# Patient Record
Sex: MALE | Race: WHITE
[De-identification: names, ages, dates, MRNs, and addresses within clinical notes are randomized per-mention and may not be internally consistent; named-entity substitution may affect disease eponyms.]

---

## 2019-10-21 ENCOUNTER — HOSPITAL ENCOUNTER (EMERGENCY)
Dept: HOSPITAL 75 - ER FS | Age: 54
Discharge: HOME | End: 2019-10-21
Payer: COMMERCIAL

## 2019-10-21 VITALS — DIASTOLIC BLOOD PRESSURE: 89 MMHG | SYSTOLIC BLOOD PRESSURE: 151 MMHG

## 2019-10-21 VITALS — BODY MASS INDEX: 31.39 KG/M2 | WEIGHT: 224.21 LBS | HEIGHT: 70.87 IN
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
## 2019-10-21 DIAGNOSIS — S50.861A: Primary | ICD-10-CM

## 2019-10-21 DIAGNOSIS — L03.113: ICD-10-CM

## 2019-10-21 PROCEDURE — 99283 EMERGENCY DEPT VISIT LOW MDM: CPT

## 2019-10-21 NOTE — ED INTEGUMENTARY GENERAL
General


Chief Complaint:  Bite-Animal/Human/Insect


Stated Complaint:  RT ARM SPIDER BITE


Source:  patient


Exam Limitations:  no limitations





History of Present Illness


Date Seen by Provider:  Oct 21, 2019


Time Seen by Provider:  17:39


Initial Comments


Patient complains of insect bite to his right forearm that he first noticed at 

noon today.  Since then it is increased in size redness and firmness.  It also 

hurts.  No fevers or chills.  No streaking.





Allergies and Home Medications


Patient Home Medication List


Home Medication List Reviewed:  Yes





Review of Systems


Review of Systems


Constitutional:  no symptoms reported


EENTM:  no symptoms reported


Respiratory:  no symptoms reported


Cardiovascular:  no symptoms reported


Skin:  see HPI





Past Medical-Social-Family Hx


Patient Social History


Recent Foreign Travel:  No


Contact w/Someone Who Travel:  No





Physical Exam


Vital Signs


Capillary Refill :


General Appearance:  WD/WN, no apparent distress


Neck:  supple


Cardiovascular:  regular rate, rhythm


Respiratory:  lungs clear


Gastrointestinal:  soft


Extremities:  normal inspection


Neurologic/Psychiatric:  alert, normal mood/affect


Skin:  normal color, warm/dry, other (there is noted centimeter diameter area of

redness and induration tenderness to the lateral aspect of right mid forearm.  

There is no fluctuance.  No streaking)





Departure


Impression





   Primary Impression:  


   Insect bites


   Additional Impression:  


   Cellulitis of right arm


Disposition:  01 HOME, SELF-CARE


Condition:  Stable





Departure-Patient Inst.


Decision time for Depature:  17:41


Referrals:  


NO,LOCAL PHYSICIAN (PCP)


Primary Care Physician


Patient Instructions:  Cellulitis and Erysipelas (Skin Infections)





Add. Discharge Instructions:  


Take medicines as prescribed.  Keep arm elevated as much as possible.  Seek 

medical attention if not improving or if worse in the next 24 hours.  All 

discharge instructions reviewed with patient and/or family. Voiced 

understanding.


Scripts


Hydrocortisone/Aloe Vera (Hydrocortisone Plus 1% Cream) 28.4 Gm Cream..g.


28.4 GM TP BID, #1 TUBE


   Prov: GARETT STARKS MD         10/21/19 


Cephalexin (Keflex) 500 Mg Capsule


500 MG PO TID for 7 Days, #21 CAP


   Prov: GARETT STARKS MD         10/21/19











GARETT STARKS MD              Oct 21, 2019 17:44

## 2021-09-24 ENCOUNTER — HOSPITAL ENCOUNTER (EMERGENCY)
Dept: HOSPITAL 75 - ER FS | Age: 56
Discharge: HOME | End: 2021-09-24
Payer: COMMERCIAL

## 2021-09-24 VITALS — WEIGHT: 250 LBS | HEIGHT: 70.98 IN | BODY MASS INDEX: 35 KG/M2

## 2021-09-24 VITALS — DIASTOLIC BLOOD PRESSURE: 77 MMHG | SYSTOLIC BLOOD PRESSURE: 147 MMHG

## 2021-09-24 DIAGNOSIS — B34.9: Primary | ICD-10-CM

## 2021-09-24 DIAGNOSIS — F17.290: ICD-10-CM

## 2021-09-24 PROCEDURE — 87804 INFLUENZA ASSAY W/OPTIC: CPT

## 2021-09-24 PROCEDURE — 71045 X-RAY EXAM CHEST 1 VIEW: CPT

## 2021-09-24 NOTE — ED COUGH/URI
General


Chief Complaint:  Cough/Cold/Flu Symptoms


Stated Complaint:  COUGH; HEADACHE; SOB





History of Present Illness


Date Seen by Provider:  Sep 24, 2021


Time Seen by Provider:  07:25


Initial Comments


Patient is a 55-year-old male who presents with complaints of cough, sore 

throat, body aches, generalized weakness malaise and fever.  Symptoms began 3 to

4 days ago and have gradually progressed.  Reports mild dyspnea.  He is a smoker

but denies history of COPD asthma or CHF.  No history of coronary artery di

sease.  Denies leg pain or swelling.  Patient has been treating his symptoms 

with rest and increased fluid consumption although he has continued to work.  

Patient has not received the Covid vaccination.


Timing/Duration:  week, getting worse, changing over time


Severity/Quality:  mild


Prior Episodes/Possible Cause:  no prior episodes


Modifying Factors:  Improves With Other


Associated Symptoms:  chest pain/soreness, cough, fever/chills, nasal 

congestion, shortness of breath, sore throat





Allergies and Home Medications


Allergies


Coded Allergies:  


     No Known Drug Allergies (Unverified , 10/21/19)





Patient Home Medication List


Home Medication List Reviewed:  Yes


Albuterol Sulfate (Proventil Hfa) 6.7 Gm Hfa.aer.ad, 6.7 GM INH Q4H


   Prescribed by: ANA VELAZCO on 9/24/21 0756


Azithromycin (Zithromax) 250 Mg Tablet, 250 MG PO UD


   Prescribed by: ANA VELAZCO on 9/24/21 0756


Cephalexin (Keflex) 500 Mg Capsule, 500 MG PO TID


   Prescribed by: GARETT STARKS on 10/21/19 1744


Hydrocortisone/Aloe Vera (Hydrocortisone Plus 1% Cream) 28.4 Gm Cream..g., 28.4 

GM TP BID


   Prescribed by: GARETT STARKS on 10/21/19 1744


Prednisone (Prednisone) 20 Mg Tab, 60 MG PO DAILY


   Prescribed by: ANA VELAZCO on 9/24/21 0756





Review of Systems


Review of Systems


Constitutional:  see HPI


EENTM:  see HPI


Respiratory:  see HPI


Cardiovascular:  see HPI


Gastrointestinal:  see HPI


Genitourinary:  see HPI


Musculoskeletal:  see HPI


Skin:  see HPI


Psychiatric/Neurological:  See HPI


Hematologic/Lymphatic:  See HPI


Immunological/Allergic:  see HPI





All Other Systems Reviewed


Negative Unless Noted:  Yes





Past Medical-Social-Family Hx


Patient Social History


Tobacco Use?:  Yes





Seasonal Allergies


Seasonal Allergies:  No





Past Medical History


Surgeries:  Yes


Orthopedic


Respiratory:  No


Cardiac:  No


Neurological:  No


Genitourinary:  No


Gastrointestinal:  No


Musculoskeletal:  No


Endocrine:  No


HEENT:  No


Cancer:  No


Psychosocial:  No


Integumentary:  No


Psoriasis





Physical Exam





Vital Signs - First Documented








 9/24/21





 07:27


 


Temp 36.6


 


Pulse 85


 


Resp 18


 


B/P (MAP) 147/77 (100)


 


Pulse Ox 94


 


O2 Delivery Room Air





Capillary Refill :


Height: '"


Weight: lbs. oz. kg; 31.00 BMI


Method:


General Appearance:  WD/WN, no apparent distress


Eyes:  Bilateral Eye Normal Inspection, Bilateral Eye PERRL, Bilateral Eye EOMI


HEENT:  PERRL/EOMI, normal ENT inspection


Neck:  supple


Respiratory:  normal breath sounds, decreased breath sounds, other


Cardiovascular:  normal peripheral pulses, regular rate, rhythm


Gastrointestinal:  non tender, soft


Extremities:  normal range of motion, non-tender


Neurologic/Psychiatric:  alert, oriented x 3


Skin:  normal color


Lymphatic:  no adenopathy





Focused Exam


Sepsis Stage:  Ruled Out





Progress/Results/Core Measures


Suspected Sepsis


SIRS


Temperature: 


Pulse:  


Respiratory Rate: 


 


Blood Pressure  / 


Mean:





Results/Orders


Lab Results





Laboratory Tests








Test


 9/24/21


07:30 Range/Units


 


 


Influenza Type A Antigen NEGATIVE  NEGATIVE  


 


Influenza Type B Antigen NEGATIVE  NEGATIVE  








My Orders





Orders - ANA VELAZCO DO


Chest 1 View Ap/Pa Only (9/24/21 07:31)


Influenza A & B Antigens (9/24/21 07:38)





Vital Signs/I&O











 9/24/21 9/24/21





 07:27 07:36


 


Temp 36.6 


 


Pulse 85 


 


Resp 18 


 


B/P (MAP) 147/77 (100) 


 


Pulse Ox 94 


 


O2 Delivery Room Air Room Air





Capillary Refill :





Departure


Communication (Admissions)


Chest x-ray:


Influenza:





Patient symptoms suggestive of Covid.  He is not currently hypoxic requiring 

oxygen.  Influenza and chest x-ray reviewed.  Patient would benefit from out

patient Covid testing and would be a potential candidate for antibiotic 

infusion.  Will place on steroids inhaler and antibiotics.  Return precautions 

reviewed.





Impression





   Primary Impression:  


   Acute viral syndrome


Disposition:  01 HOME, SELF-CARE


Condition:  Stable





Departure-Patient Inst.


Decision time for Depature:  07:55


Referrals:  


SELF,CONOR ORTEGA (PCP/Family)


Primary Care Physician


Patient Instructions:  Viral Upper Respiratory Infection, Adult (DC)





Add. Discharge Instructions:  


You were evaluated in the emergency department for Covid-like symptoms.  Chest 

x-ray and flu swabs were performed and are nondiagnostic.  Please contact your 

PCP or urgent care to arrange for outpatient Covf inabilityid testing.  

Otherwise stay home and self quarantine, monitor home oxygen levels, and take 

newly prescribed medications as prescribed.  If Covid positive you are likely a 

candidate for antibody infusion therapy.  Please contact your PCP to make 

arrangements for infusions.  Return if inability to maintain oxygen saturation 

greater than 90% at rest or other concerning symptoms.





All discharge instructions reviewed with patient and/or family. Voiced 

understanding.


Scripts


Azithromycin (Zithromax) 250 Mg Tablet


250 MG PO UD, #6 TAB


   TAKE 2 TABLETS TODAY, THEN TAKE 1 TABLET DAILY FOR 4 MORE DAYS


   Prov: ANA VELAZCO DO         9/24/21 


Albuterol Sulfate (Proventil Hfa) 6.7 Gm Hfa.aer.ad


6.7 GM INH Q4H, #1 GM


   Prov: ANA VELAZCO DO         9/24/21 


Prednisone (Prednisone) 20 Mg Tab


60 MG PO DAILY, #6 TAB 0 Refills


   Prov: ANA VELAZCO DO         9/24/21











ANA VELAZCO DO                   Sep 24, 2021 07:26

## 2021-09-24 NOTE — DIAGNOSTIC IMAGING REPORT
CHEST 1 VIEW AP/PA ONLY



Indication: Cough



Comparison: None available. 



Findings:

No focal airspace disease in the visualized lungs. Please note

that the posterior lower lobes are poorly evaluated by portable

radiography. No pleural effusion or pneumothorax. Normal

cardiomediastinal silhouette.



Impression: 

1. No acute cardiopulmonary process by portable radiography.



Dictated by: 



  Dictated on workstation # [id number]

## 2021-12-27 ENCOUNTER — HOSPITAL ENCOUNTER (OUTPATIENT)
Dept: HOSPITAL 75 - INFUSION | Age: 56
End: 2021-12-27
Attending: FAMILY MEDICINE
Payer: COMMERCIAL

## 2021-12-27 VITALS — DIASTOLIC BLOOD PRESSURE: 76 MMHG | SYSTOLIC BLOOD PRESSURE: 138 MMHG

## 2021-12-27 VITALS — SYSTOLIC BLOOD PRESSURE: 142 MMHG | DIASTOLIC BLOOD PRESSURE: 77 MMHG

## 2021-12-27 VITALS — HEIGHT: 70.87 IN | BODY MASS INDEX: 30.34 KG/M2 | WEIGHT: 216.71 LBS

## 2021-12-27 DIAGNOSIS — U07.1: Primary | ICD-10-CM

## 2022-10-13 ENCOUNTER — HOSPITAL ENCOUNTER (EMERGENCY)
Dept: HOSPITAL 75 - ER FS | Age: 57
Discharge: HOME | End: 2022-10-13
Payer: COMMERCIAL

## 2022-10-13 VITALS — WEIGHT: 198.86 LBS | HEIGHT: 70.98 IN | BODY MASS INDEX: 27.84 KG/M2

## 2022-10-13 VITALS — DIASTOLIC BLOOD PRESSURE: 84 MMHG | SYSTOLIC BLOOD PRESSURE: 146 MMHG

## 2022-10-13 DIAGNOSIS — Z28.310: ICD-10-CM

## 2022-10-13 DIAGNOSIS — S61.412A: ICD-10-CM

## 2022-10-13 DIAGNOSIS — F17.200: ICD-10-CM

## 2022-10-13 DIAGNOSIS — S51.811A: ICD-10-CM

## 2022-10-13 DIAGNOSIS — S01.81XA: Primary | ICD-10-CM

## 2022-10-13 DIAGNOSIS — S51.812A: ICD-10-CM

## 2022-10-13 DIAGNOSIS — Z23: ICD-10-CM

## 2022-10-13 DIAGNOSIS — S61.411A: ICD-10-CM
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
## 2022-10-13 PROCEDURE — 90715 TDAP VACCINE 7 YRS/> IM: CPT
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
## 2023-03-13 ENCOUNTER — HOSPITAL ENCOUNTER (EMERGENCY)
Dept: HOSPITAL 75 - ER FS | Age: 58
Discharge: HOME | End: 2023-03-13
Payer: SELF-PAY

## 2023-03-13 VITALS — DIASTOLIC BLOOD PRESSURE: 97 MMHG | SYSTOLIC BLOOD PRESSURE: 169 MMHG

## 2023-03-13 VITALS — WEIGHT: 200.18 LBS | HEIGHT: 67.99 IN | BODY MASS INDEX: 30.34 KG/M2

## 2023-03-13 DIAGNOSIS — R05.9: ICD-10-CM

## 2023-03-13 DIAGNOSIS — B34.9: Primary | ICD-10-CM

## 2023-03-13 DIAGNOSIS — R00.0: ICD-10-CM

## 2023-03-13 DIAGNOSIS — F17.200: ICD-10-CM

## 2023-03-13 DIAGNOSIS — R50.9: ICD-10-CM

## 2023-03-13 DIAGNOSIS — Z28.310: ICD-10-CM

## 2023-03-13 DIAGNOSIS — R51.9: ICD-10-CM

## 2023-03-13 PROCEDURE — 71046 X-RAY EXAM CHEST 2 VIEWS: CPT

## 2023-03-13 NOTE — DIAGNOSTIC IMAGING REPORT
CHEST PA/LAT (2 VIEW)



Indication: Cough and fever



Comparison: 09/24/2021



Findings:

No pulmonary mass or consolidation. No pleural effusion or

pneumothorax. Normal heart size and mediastinal contours. 



Impression:

No acute cardiopulmonary process.



Dictated by: 



  Dictated on workstation # [id number]

## 2023-03-13 NOTE — ED GENERAL
General


Stated Complaint:  MIGRAINE HEADACHE,COUGH,COLD CHILLS, HOT FLASHES


Source of Information:  Patient, Family (son)


Exam Limitations:  No Limitations





History of Present Illness


Date Seen by Provider:  Mar 13, 2023


Time Seen by Provider:  21:38


Initial Comments


57-year-old male presents to the emergency department today for cough, headache,

body aches, chills.  Symptoms started on Saturday and have been progressive.  He

states he feels like he does when he had COVID.  No sick contacts.  He has been 

immunized.  Cough is minimally productive.  He does have a history of psoriasis.





All other systems reviewed and negative except documented per HPI.





Voice recognition software was used to help create this chart





Allergies and Home Medications


Allergies


Coded Allergies:  


     No Known Drug Allergies (Unverified , 10/21/19)





Patient Home Medication List


Home Medication List Reviewed:  Yes


Albuterol Sulfate (Proventil Hfa) 6.7 Gm Hfa.aer.ad, 6.7 GM INH Q4H


   Prescribed by: ANA VELAZCO on 9/24/21 0756


Azithromycin (Zithromax) 250 Mg Tablet, 250 MG PO UD


   Prescribed by: ANA VELAZCO on 9/24/21 0756


Cephalexin (Keflex) 500 Mg Capsule, 500 MG PO TID


   Prescribed by: GARETT STARKS on 10/21/19 1744


Hydrocortisone/Aloe Vera (Hydrocortisone Plus 1% Cream) 28.4 Gm Cream..g., 28.4 

GM TP BID


   Prescribed by: GARETT STARKS on 10/21/19 1744


Prednisone (Prednisone) 20 Mg Tab, 60 MG PO DAILY


   Prescribed by: ANA VELAZCO on 9/24/21 0756





Review of Systems


Review of Systems


Constitutional:  chills





Past Medical-Social-Family Hx


Patient Social History


Tobacco Use?:  Yes


Use of E-Cig and/or Vaping dev:  No


Substance use?:  No


Alcohol Use?:  No





Seasonal Allergies


Seasonal Allergies:  No





Past Medical History


Surgeries:  Yes


Orthopedic


Respiratory:  No


Cardiac:  No


Neurological:  No


Genitourinary:  No


Gastrointestinal:  No


Musculoskeletal:  No


Endocrine:  No


HEENT:  No


Cancer:  No


Psychosocial:  No


Integumentary:  No


Psoriasis





Family Medical History


Reviewed Nursing Family Hx


No Pertinent Family Hx





Physical Exam


Vital Signs





Vital Signs - First Documented








 3/13/23





 21:41


 


Temp 38.1


 


Pulse 107


 


Resp 18


 


B/P (MAP) 169/97 (121)


 


Pulse Ox 98


 


O2 Delivery Room Air





Capillary Refill :


Height, Weight, BMI


Height: '"


Weight: lbs. oz. kg; 27.00 BMI


Method:


General Appearance:  No Apparent Distress, WD/WN


HEENT:  PERRL/EOMI, TMs Normal, Normal ENT Inspection, Pharynx Normal


Neck:  Full Range of Motion, Normal Inspection, Non Tender, Supple


Respiratory:  Chest Non Tender, Lungs Clear, Normal Breath Sounds, No Accessory 

Muscle Use, No Respiratory Distress


Cardiovascular:  No Murmur, Normal Peripheral Pulses, Tachycardia


Gastrointestinal:  Normal Bowel Sounds, No Organomegaly, No Pulsatile Mass, Non 

Tender, Soft


Back:  Normal Inspection, No Vertebral Tenderness


Extremity:  Normal Capillary Refill, Normal Inspection, Normal Range of Motion, 

Non Tender, No Calf Tenderness, Other (Few psoriatic plaques on bilateral arms.)


Neurologic/Psychiatric:  Alert, Oriented x3, Normal Mood/Affect


Skin:  Other (Psoriatic plaques as above.)





Progress/Results/Core Measures


Suspected Sepsis


SIRS


Temperature: 


Pulse:  


Respiratory Rate: 


 


Blood Pressure  / 


Mean:





Results/Orders


My Orders





Orders - CHAMP VASQUEZ DO


Chest Pa/Lat (2 View) (3/13/23 21:45)


Ketorolac Injection (Toradol Injection) (3/13/23 21:45)


Acetaminophen  Tablet (Tylenol  Tablet) (3/13/23 22:00)





Vital Signs/I&O











 3/13/23





 21:41


 


Temp 38.1


 


Pulse 107


 


Resp 18


 


B/P (MAP) 169/97 (121)


 


Pulse Ox 98


 


O2 Delivery Room Air





Capillary Refill :





Departure


Communication (Admissions)


I have independently reviewed the chest x-ray.  No evidence for focal pneumonia 

indicating bacterial origin.  His vital signs are reassuring.  He does have a 

slight fever, tachycardia in accordance, very mild.  Blood pressure stable and 

oxygen saturations are normal.  Lungs are clear.  I believe this is likely 

COVID.  Did treat his headache with Toradol as he is driving and I cannot give 

him any sedating medications.  He will be discharged home with supportive care 

in otherwise stable condition.





Impression





   Primary Impression:  


   Acute viral syndrome


Disposition:  01 HOME, SELF-CARE


Condition:  Stable





Departure-Patient Inst.


Referrals:  


SELF,CONOR ORTEGA (PCP/Family)


Primary Care Physician


Patient Instructions:  Viral Upper Respiratory Infection, Adult (DC)





Add. Discharge Instructions:  


Alternate Tylenol and ibuprofen as needed for fever, headache and body aches.  

Increase your fluids at home.  Rest.  Do not return to work until you are fever 

free for 24 hours.


Work/School Note:  Work Release Form   Date Seen in the Emergency Department:  

Mar 13, 2023


   Return to Work:  Mar 15, 2023


   Restrictions:  Return-No Fever (24hrs)











CHAMP VASQUEZ DO            Mar 13, 2023 21:48